# Patient Record
(demographics unavailable — no encounter records)

---

## 2025-04-03 NOTE — PLAN
[FreeTextEntry1] : Patient to follow up in 1 year for annual GYN exam BP elevated, has appt in april with PCP  Mammogram and bilateral breast US scheduled in 2 weeks. Will obtain report Colonoscopy due: per JS  Bone density done 3/2025 frax 12% Hip 2.6, repeat 3/27 Pap ordered Hemoccult ordered  All questions answered, patient agreeable with plan.  I Lauren Shaw [facility] am scribing for the presence of Dr. Rodriguez the following sections HISTORY OF PRESENT ILLNESS, PAST MEDICAL/FAMILY/SOCIAL HISTORY; REVIEW OF SYSTEMS; VITAL SIGNS; PHYSICAL EXAM; DISPOSITION. I personally performed the services described in the documentation, reviewed the documentation recorded by the scribe in my presence and it accurately and completely records my words and actions.

## 2025-04-03 NOTE — PLAN
[FreeTextEntry1] : Patient to follow up in 1 year for annual GYN exam BP elevated, has appt in april with PCP  Mammogram and bilateral breast US scheduled in 2 weeks. Will obtain report Colonoscopy due: per JS  Bone density done 3/2025 frax 12% Hip 2.6, repeat 3/27 Pap ordered Hemoccult ordered  All questions answered, patient agreeable with plan.  I Lauren Shaw [facility] am scribing for the presence of Dr. Rodriguez the following sections HISTORY OF PRESENT ILLNESS, PAST MEDICAL/FAMILY/SOCIAL HISTORY; REVIEW OF SYSTEMS; VITAL SIGNS; PHYSICAL EXAM; DISPOSITION. I personally performed the services described in the documentation, reviewed the documentation recorded by the scribe in my presence and it accurately and completely records my words and actions.

## 2025-04-03 NOTE — PHYSICAL EXAM
[Chaperone Present] : A chaperone was present in the examining room during all aspects of the physical examination [Appropriately responsive] : appropriately responsive [Alert] : alert [No Acute Distress] : no acute distress [No Lymphadenopathy] : no lymphadenopathy [No Murmurs] : no murmurs [Soft] : soft [Non-tender] : non-tender [Non-distended] : non-distended [No HSM] : No HSM [No Lesions] : no lesions [No Mass] : no mass [Oriented x3] : oriented x3 [Examination Of The Breasts] : a normal appearance [No Discharge] : no discharge [No Masses] : no breast masses were palpable [Labia Majora] : normal [Labia Minora] : normal [Normal] : normal [Absent] : absent [Uterine Adnexae] : normal [Normal rectal exam] : was normal [FreeTextEntry1] : Bonita GRIER-BC [Occult Blood Positive] : was negative for occult blood analysis

## 2025-04-03 NOTE — HISTORY OF PRESENT ILLNESS
[FreeTextEntry1] : 79 year old female  postmenopausal female hx hx of right hip replacement, total hysterectomy who presents today for a routine GYN exam.  Pap smear at that time was negative for malignancy. The patient has no current GYN complaints and denies any postmenopausal bleeding. No changes to bladder or bowel pattern. The patient denies abdominal pain or bloating.